# Patient Record
Sex: MALE | Race: WHITE
[De-identification: names, ages, dates, MRNs, and addresses within clinical notes are randomized per-mention and may not be internally consistent; named-entity substitution may affect disease eponyms.]

---

## 2018-04-06 ENCOUNTER — HOSPITAL ENCOUNTER (EMERGENCY)
Dept: HOSPITAL 5 - ED | Age: 68
Discharge: HOME | End: 2018-04-06
Payer: COMMERCIAL

## 2018-04-06 VITALS — SYSTOLIC BLOOD PRESSURE: 169 MMHG | DIASTOLIC BLOOD PRESSURE: 90 MMHG

## 2018-04-06 DIAGNOSIS — Z88.0: ICD-10-CM

## 2018-04-06 DIAGNOSIS — I48.0: Primary | ICD-10-CM

## 2018-04-06 DIAGNOSIS — I10: ICD-10-CM

## 2018-04-06 LAB
BASOPHILS # (AUTO): 0.1 K/MM3 (ref 0–0.1)
BASOPHILS NFR BLD AUTO: 1.3 % (ref 0–1.8)
BUN SERPL-MCNC: 9 MG/DL (ref 9–20)
BUN/CREAT SERPL: 13 %
CALCIUM SERPL-MCNC: 8.9 MG/DL (ref 8.4–10.2)
EOSINOPHIL # BLD AUTO: 0.1 K/MM3 (ref 0–0.4)
EOSINOPHIL NFR BLD AUTO: 1.9 % (ref 0–4.3)
HCT VFR BLD CALC: 40.2 % (ref 35.5–45.6)
HEMOLYSIS INDEX: 8
HGB BLD-MCNC: 13.5 GM/DL (ref 11.8–15.2)
LYMPHOCYTES # BLD AUTO: 1.7 K/MM3 (ref 1.2–5.4)
LYMPHOCYTES NFR BLD AUTO: 42 % (ref 13.4–35)
MCH RBC QN AUTO: 29 PG (ref 28–32)
MCHC RBC AUTO-ENTMCNC: 34 % (ref 32–34)
MCV RBC AUTO: 87 FL (ref 84–94)
MONOCYTES # (AUTO): 0.3 K/MM3 (ref 0–0.8)
MONOCYTES % (AUTO): 7.4 % (ref 0–7.3)
PLATELET # BLD: 258 K/MM3 (ref 140–440)
RBC # BLD AUTO: 4.64 M/MM3 (ref 3.65–5.03)

## 2018-04-06 PROCEDURE — 36415 COLL VENOUS BLD VENIPUNCTURE: CPT

## 2018-04-06 PROCEDURE — 84484 ASSAY OF TROPONIN QUANT: CPT

## 2018-04-06 PROCEDURE — 93010 ELECTROCARDIOGRAM REPORT: CPT

## 2018-04-06 PROCEDURE — 85025 COMPLETE CBC W/AUTO DIFF WBC: CPT

## 2018-04-06 PROCEDURE — 93005 ELECTROCARDIOGRAM TRACING: CPT

## 2018-04-06 PROCEDURE — 99283 EMERGENCY DEPT VISIT LOW MDM: CPT

## 2018-04-06 PROCEDURE — 80048 BASIC METABOLIC PNL TOTAL CA: CPT

## 2018-04-06 NOTE — EMERGENCY DEPARTMENT REPORT
ED General Adult HPI





- General


Chief complaint: Chest Pain


Stated complaint: HEART PALPITATIONS


Time Seen by Provider: 04/06/18 16:01


Source: patient


Mode of arrival: Ambulatory


Limitations: No Limitations





- History of Present Illness


Initial comments: 





Mr. Solano is a healthy 67-year-old male with a history of anxiety and atrial 

fibrillation paroxysmal.  Atrial fibrillation has been controlled in normal 

sinus rhythm with propafenone 225 mg twice a day.  5 months ago he began taking 

half a tablet at night because he was concerned for bradycardia.  Over the last 

3 days he has had recurrent palpitations in the morning.  The palpitations 

stopped after taking his morning dose of propafenone.  He attributed the 

palpiations to caffeine intake 3 days ago.  this morning he had burning chest 

pain with arm pain and back pain with the palpitations.  He has had multiple 

normal cardiac evaluations including stress test and echocardiogram.  He is 

followed by Dr. Del Toro cardiologist.  He currently is pain-free.  He denies any 

palpitations current.


Severity scale (0 -10): 0





- Related Data


 Previous Rx's











 Medication  Instructions  Recorded  Last Taken  Type


 


Acetaminophen/Codeine [Tylenol #3] 1 tab PO Q6H PRN #20 tab 03/29/17 Unknown Rx


 


Bacitracin Zinc [Antibiotic] 1 applic TP TID #1 tube 03/29/17 Unknown Rx


 


Cephalexin [Keflex] 500 mg PO BID #10 capsule 03/29/17 Unknown Rx


 


Cyclobenzaprine [Flexeril] 10 mg PO QHS PRN #20 tablet 03/29/17 Unknown Rx


 


Ibuprofen [Motrin] 600 mg PO Q8H PRN #40 tablet 03/29/17 Unknown Rx











 Allergies











Allergy/AdvReac Type Severity Reaction Status Date / Time


 


Penicillins AdvReac  Dizziness Verified 04/06/18 07:58














ED Review of Systems


ROS: 


Stated complaint: HEART PALPITATIONS


Other details as noted in HPI





Comment: All other systems reviewed and negative


Constitutional: denies: fever, malaise


Respiratory: denies: cough


Cardiovascular: chest pain, palpitations





ED Past Medical Hx





- Past Medical History


Previous Medical History?: Yes


Hx Hypertension: Yes


Additional medical history: ATRIAL FIBRILLATION





- Surgical History


Past Surgical History?: Yes


Hx Internal Defibrillator: Yes





- Social History


Smoking Status: Never Smoker


Substance Use Type: None





- Medications


Home Medications: 


 Home Medications











 Medication  Instructions  Recorded  Confirmed  Last Taken  Type


 


Acetaminophen/Codeine [Tylenol #3] 1 tab PO Q6H PRN #20 tab 03/29/17  Unknown Rx


 


Bacitracin Zinc [Antibiotic] 1 applic TP TID #1 tube 03/29/17  Unknown Rx


 


Cephalexin [Keflex] 500 mg PO BID #10 capsule 03/29/17  Unknown Rx


 


Cyclobenzaprine [Flexeril] 10 mg PO QHS PRN #20 tablet 03/29/17  Unknown Rx


 


Ibuprofen [Motrin] 600 mg PO Q8H PRN #40 tablet 03/29/17  Unknown Rx














ED Physical Exam





- General


Limitations: No Limitations


General appearance: alert, in no apparent distress





- Head


Head exam: Present: atraumatic, normocephalic





- Eye


Eye exam: Present: normal appearance





- ENT


ENT exam: Present: mucous membranes moist





- Neck


Neck exam: Present: normal inspection.  Absent: tenderness, meningismus





- Respiratory


Respiratory exam: Present: normal lung sounds bilaterally.  Absent: respiratory 

distress, wheezes, rales





- Cardiovascular


Cardiovascular Exam: Present: regular rate, normal rhythm.  Absent: systolic 

murmur, diastolic murmur, rubs, gallop





- GI/Abdominal


GI/Abdominal exam: Present: soft, normal bowel sounds.  Absent: distended, 

tenderness, guarding, rebound





- Rectal


Rectal exam: Present: deferred





- Extremities Exam


Extremities exam: Present: normal inspection





- Back Exam


Back exam: Present: normal inspection





- Neurological Exam


Neurological exam: Present: alert, oriented X3





- Psychiatric


Psychiatric exam: Present: normal affect, normal mood





- Skin


Skin exam: Present: warm, dry, intact, normal color.  Absent: rash





ED Course





 Vital Signs











  04/06/18





  08:04


 


Temperature 98.6 F


 


Pulse Rate 65


 


Respiratory 16





Rate 


 


Blood Pressure 169/90





[Left] 


 


O2 Sat by Pulse 100





Oximetry 














ED Medical Decision Making





- Lab Data


Result diagrams: 


 04/06/18 08:02





 04/06/18 08:00








 Laboratory Results - last 24 hr











  04/06/18 04/06/18 04/06/18





  08:00 08:02 10:29


 


WBC   4.1 L 


 


RBC   4.64 


 


Hgb   13.5 


 


Hct   40.2 


 


MCV   87 


 


MCH   29 


 


MCHC   34 


 


RDW   14.2 


 


Plt Count   258 


 


Lymph % (Auto)   42.0 H 


 


Mono % (Auto)   7.4 H 


 


Eos % (Auto)   1.9 


 


Baso % (Auto)   1.3 


 


Lymph #   1.7 


 


Mono #   0.3 


 


Eos #   0.1 


 


Baso #   0.1 


 


Seg Neutrophils %   47.4 


 


Seg Neutrophils #   1.9 


 


Sodium  137  


 


Potassium  3.6  


 


Chloride  98.6  


 


Carbon Dioxide  28  


 


Anion Gap  14  


 


BUN  9  


 


Creatinine  0.7 L  


 


Estimated GFR  > 60  


 


BUN/Creatinine Ratio  13  


 


Glucose  130 H  


 


Calcium  8.9  


 


Troponin T  < 0.010   < 0.010














  04/06/18





  14:36


 


WBC 


 


RBC 


 


Hgb 


 


Hct 


 


MCV 


 


MCH 


 


MCHC 


 


RDW 


 


Plt Count 


 


Lymph % (Auto) 


 


Mono % (Auto) 


 


Eos % (Auto) 


 


Baso % (Auto) 


 


Lymph # 


 


Mono # 


 


Eos # 


 


Baso # 


 


Seg Neutrophils % 


 


Seg Neutrophils # 


 


Sodium 


 


Potassium 


 


Chloride 


 


Carbon Dioxide 


 


Anion Gap 


 


BUN 


 


Creatinine 


 


Estimated GFR 


 


BUN/Creatinine Ratio 


 


Glucose 


 


Calcium 


 


Troponin T  < 0.010











 Vital Signs - 24 hr











  04/06/18





  08:04


 


Temperature 98.6 F


 


Pulse Rate 65


 


Respiratory 16





Rate 


 


Blood Pressure 169/90





[Left] 


 


O2 Sat by Pulse 100





Oximetry 














- EKG Data


-: EKG Interpreted by Me


EKG shows normal: sinus rhythm, intervals, QRS complexes, ST-T waves


Rate: normal





- EKG Data





04/06/18 17:08


EKG obtained at 0 757


Rate 60 beats a minute normal sinus rhythm left axis deviation normal intervals 

no ST elevation





- Medical Decision Making





Mr. Solano presents with palpitations for the last 3 days.  With palpitations 

today he had associated chest arm back pain.  I suspect paroxysmal atrial 

fibrillation which is controlled with his morning dose of propafenone.  No 

evidence of MI or acute coronary syndrome.  Several serial troponins are 

negative.  I spoke with Dr. Del Toro who recommended full dosing of propafenone 

tablet twice a day.  Patient is currently symptom-free.  I do not suspect 

pulmonary embolism or infection at this time.





Patient understands return precautions.


Critical care attestation.: 


If time is entered above; I have spent that time in minutes in the direct care 

of this critically ill patient, excluding procedure time.








ED Disposition


Clinical Impression: 


 Paroxysmal atrial fibrillation, Chest pain





Disposition: DC-01 TO HOME OR SELFCARE


Is pt being admited?: No


Does the pt Need Aspirin: No


Condition: Stable


Instructions:  Chest Pain (ED), Atrial Fibrillation (ED)


Referrals: 


DANNIELLE GOLD MD [Primary Care Provider] - 3-5 Days


TONI DEL TORO MD [Staff Physician] - 3-5 Days


Forms:  Work/School Release Form(ED)


Time of Disposition: 17:12

## 2020-11-11 LAB
ALBUMIN SERPL-MCNC: 3.9 G/DL (ref 3.9–5)
ALT SERPL-CCNC: 19 UNITS/L (ref 7–56)
BUN SERPL-MCNC: 9 MG/DL (ref 9–20)
BUN/CREAT SERPL: 13 %
CALCIUM SERPL-MCNC: 9 MG/DL (ref 8.4–10.2)
HCT VFR BLD CALC: 35.1 % (ref 35.5–45.6)
HEMOLYSIS INDEX: 14
HGB BLD-MCNC: 12.4 GM/DL (ref 11.8–15.2)
MCHC RBC AUTO-ENTMCNC: 35 % (ref 32–34)
MCV RBC AUTO: 88 FL (ref 84–94)
PLATELET # BLD: 221 K/MM3 (ref 140–440)
RBC # BLD AUTO: 3.99 M/MM3 (ref 3.65–5.03)

## 2020-11-11 NOTE — ANESTHESIA CONSULTATION
Anesthesia Consult and Med Hx


Date of service: 11/18/20





- Airway


Anesthetic Teeth Evaluation: Dentures, Edentulous (Upper)


ROM Head & Neck: Adequate


Mental/Hyoid Distance: Adequate


Mallampati Class: Class II


Intubation Access Assessment: Good





- Pre-Operative Health Status


Proposed Anesthetic Plan: Spinal (SAB; GA if needed)





- Pulmonary


Hx Smoking: No


Hx Asthma: No


COPD: No


Hx Pneumonia: No


Hx Sleep Apnea: No (SNORES)





- Cardiovascular System


Hx Hypertension: Yes


Hx Heart Attack/AMI: No


Hx Cardia Arrhythmia: Yes (Just had an ablation)


Hx Pacemaker: No


Hx Internal Defibrillator: Yes


Hx Heart Murmur: No





- Central Nervous System


Hx Seizures: No


Hx Back Pain: Yes (LOWER SOMETIMES)


Hx Psychiatric Problems: Yes





- Gastrointestinal


Hx Ulcer: Yes





- Endocrine


Hx End Stage Renal Disease: No


Hx Cirrhosis: No


Hx Liver Disease: No





- Hematic


Hx Anemia: No


Hx Sickle Cell Disease: No





- Other Systems


Hx Alcohol Use: No


Hx Substance Use: No


Hx Cancer: No

## 2020-11-18 ENCOUNTER — HOSPITAL ENCOUNTER (OUTPATIENT)
Dept: HOSPITAL 5 - OR | Age: 70
Discharge: HOME | End: 2020-11-18
Attending: UROLOGY
Payer: COMMERCIAL

## 2020-11-18 VITALS — SYSTOLIC BLOOD PRESSURE: 111 MMHG | DIASTOLIC BLOOD PRESSURE: 65 MMHG

## 2020-11-18 DIAGNOSIS — Z20.828: ICD-10-CM

## 2020-11-18 DIAGNOSIS — Z79.82: ICD-10-CM

## 2020-11-18 DIAGNOSIS — I10: ICD-10-CM

## 2020-11-18 DIAGNOSIS — Z88.0: ICD-10-CM

## 2020-11-18 DIAGNOSIS — F41.9: ICD-10-CM

## 2020-11-18 DIAGNOSIS — Z87.442: ICD-10-CM

## 2020-11-18 DIAGNOSIS — K21.9: ICD-10-CM

## 2020-11-18 DIAGNOSIS — I42.9: ICD-10-CM

## 2020-11-18 DIAGNOSIS — I48.91: ICD-10-CM

## 2020-11-18 DIAGNOSIS — Z87.440: ICD-10-CM

## 2020-11-18 DIAGNOSIS — R97.20: Primary | ICD-10-CM

## 2020-11-18 DIAGNOSIS — Z79.899: ICD-10-CM

## 2020-11-18 PROCEDURE — 88305 TISSUE EXAM BY PATHOLOGIST: CPT

## 2020-11-18 PROCEDURE — 52005 CYSTO W/URTRL CATHJ: CPT

## 2020-11-18 PROCEDURE — 88344 IMHCHEM/IMCYTCHM EA MLT ANTB: CPT

## 2020-11-18 PROCEDURE — 85027 COMPLETE CBC AUTOMATED: CPT

## 2020-11-18 PROCEDURE — 36415 COLL VENOUS BLD VENIPUNCTURE: CPT

## 2020-11-18 PROCEDURE — 74420 UROGRAPHY RTRGR +-KUB: CPT

## 2020-11-18 PROCEDURE — 80053 COMPREHEN METABOLIC PANEL: CPT

## 2020-11-18 PROCEDURE — 55700: CPT

## 2020-11-18 PROCEDURE — A4217 STERILE WATER/SALINE, 500 ML: HCPCS

## 2020-11-18 PROCEDURE — 76872 US TRANSRECTAL: CPT

## 2020-11-18 PROCEDURE — U0003 INFECTIOUS AGENT DETECTION BY NUCLEIC ACID (DNA OR RNA); SEVERE ACUTE RESPIRATORY SYNDROME CORONAVIRUS 2 (SARS-COV-2) (CORONAVIRUS DISEASE [COVID-19]), AMPLIFIED PROBE TECHNIQUE, MAKING USE OF HIGH THROUGHPUT TECHNOLOGIES AS DESCRIBED BY CMS-2020-01-R: HCPCS

## 2020-11-18 NOTE — DISCHARGE SUMMARY
Short Stay Discharge Plan


Activity: other (no straining )


Weight Bearing Status: Full Weight Bearing


Diet: low fat


Special Instructions: other (ic fluids )


Follow up with: 


DANNIELLE GOLD MD [Primary Care Provider] - 7 Days


GEMINI GONZALEZ MD [Staff Physician] - 7 Days

## 2020-11-18 NOTE — OPERATIVE REPORT
PREOPERATIVE DIAGNOSES:  Elevated prostate-specific antigen, large prostate and

hematuria.



POSTOPERATIVE DIAGNOSES:  Elevated prostate-specific antigen, large prostate and

hematuria.



PROCEDURE:  Cystoscopy, retrograde prostate biopsy.



SURGEON:  Dr. Montilla.



ANESTHESIA:  Spinal.



FINDINGS:  This is a gentleman with large prostate.  CT scan done at [facility] showed a large prostate.  No ureteral obstruction, now presents for

treatment.



DESCRIPTION OF PROCEDURE:  The patient was brought to the operating table. 

Following induction of anesthesia, placed in lithotomy position, prepped and

draped in usual sterile fashion.  Cystourethroscopy showed mildly enlarged

prostate, small middle lobe.  The gland measured approximately 74 mL. 

Retrograde showed a delicate system, good drainage, but a little bit of a

tortuous ureter.  No persistent filling defects.  Prostate biopsies were done. 

Good specimens were achieved.  We left the catheter, probably will have some

difficulty voiding and catheter can come out tomorrow.





DD: 11/18/2020 14:32

DT: 11/18/2020 14:44

JOB# [id number]  [id number]

ANTONY/SERA

## 2020-11-18 NOTE — POST OPERATIVE NOTE
Date of procedure: 11/18/20


Pre-op diagnosis: inc psa 


Post-op diagnosis: same


Findings: 





as above 


Procedure: 





flex cysto pus 


Anesthesia: GETA


Surgeon: GEMINI GONZALEZ


Estimated blood loss: minimal


Pathology: list (prostate)


Specimen disposition: to lab


Condition: stable


Disposition: PACU

## 2020-11-18 NOTE — FLUOROSCOPY REPORT
5 fluoroscopic images submitted



Indication:  Intraoperative localization



Impression:  5 images of the abdomen were submitted for documentation purposes with radiology involve
ment.  Bilateral retrograde pyelograms were performed. Approximately 10 mL of Omnipaque 300 was utili
zed.  Please refer to the operative note for complete details.



Fluoroscopic time:  7 seconds 



Signer Name: Hansel Sams MD 

Signed: 11/18/2020 3:32 PM

Workstation Name: [id number]

## 2020-11-18 NOTE — ULTRASOUND REPORT
Limited transrectal Ultrasound



HISTORY: ELEVATED PSA.



TECHNIQUE:  Grayscale and color  imaging performed.



COMPARISON:  None



IMPRESSION: Ultrasound guidance provided for prostate biopsy. Prostatic volume was 74 mL and there wa
s a heterogeneous appearance. Please refer to the operative note for complete details.

 



Signer Name: Hansel Sams MD 

Signed: 11/18/2020 3:31 PM

Workstation Name: [id number]